# Patient Record
Sex: MALE | Race: WHITE | NOT HISPANIC OR LATINO | Employment: FULL TIME | URBAN - METROPOLITAN AREA
[De-identification: names, ages, dates, MRNs, and addresses within clinical notes are randomized per-mention and may not be internally consistent; named-entity substitution may affect disease eponyms.]

---

## 2023-11-21 ENCOUNTER — APPOINTMENT (OUTPATIENT)
Dept: PRIMARY CARE | Facility: CLINIC | Age: 28
End: 2023-11-21
Payer: COMMERCIAL

## 2025-06-22 ENCOUNTER — OFFICE VISIT (OUTPATIENT)
Dept: URGENT CARE | Age: 30
End: 2025-06-22

## 2025-06-22 VITALS
HEART RATE: 60 BPM | TEMPERATURE: 97.5 F | RESPIRATION RATE: 16 BRPM | DIASTOLIC BLOOD PRESSURE: 85 MMHG | SYSTOLIC BLOOD PRESSURE: 136 MMHG | OXYGEN SATURATION: 97 %

## 2025-06-22 DIAGNOSIS — S30.861A TICK BITE OF ABDOMEN, INITIAL ENCOUNTER: Primary | ICD-10-CM
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
## 2025-06-22 PROCEDURE — 99203 OFFICE O/P NEW LOW 30 MIN: CPT | Performed by: FAMILY MEDICINE

## 2025-06-22 RX ORDER — DOXYCYCLINE 100 MG/1
CAPSULE ORAL
Qty: 2 CAPSULE | Refills: 0 | Status: SHIPPED | OUTPATIENT
Start: 2025-06-22

## 2025-06-22 NOTE — PATIENT INSTRUCTIONS
Antibiotics as directed; take with food  Tylenol or Ibuprofen as needed  Follow up with new or worsening symptoms

## 2025-06-22 NOTE — PROGRESS NOTES
Subjective   Patient ID: Marcelino oMrris is a 29 y.o. male. He presents today with a chief complaint of Insect Bite (X 1 day). Patient presents with 2 tick bites on abdomen and torso, removed today; he was hiking in [place] yesterday; saw the ticks on his abdomen and right chest today and removed them. They were very small, brown, thinks they may have been engorged. No symptoms are reported. Ticks are here, dead, in a plastic bag; whole.    History of Present Illness  HPI    Past Medical History  Allergies as of 06/22/2025    (No Known Allergies)       Prescriptions Prior to Admission[1]     Medical History[2]    Surgical History[3]         Review of Systems  Review of Systems                               Objective    Vitals:    06/22/25 1916   BP: 136/85   Pulse: 60   Resp: 16   Temp: 36.4 °C (97.5 °F)   SpO2: 97%     No LMP for male patient.    Physical Exam  Vitals and nursing note reviewed.   Constitutional:       General: He is not in acute distress.     Appearance: Normal appearance. He is not ill-appearing or toxic-appearing.   Cardiovascular:      Rate and Rhythm: Normal rate and regular rhythm.      Pulses: Normal pulses.      Heart sounds: Normal heart sounds.   Pulmonary:      Effort: Pulmonary effort is normal.      Breath sounds: Normal breath sounds.   Skin:     General: Skin is warm.      Comments: 2 small punctate lesions without foreign body noted   Neurological:      Mental Status: He is alert.         Procedures    Point of Care Test & Imaging Results from this visit  No results found for this visit on 06/22/25.   Imaging  No results found.    Cardiology, Vascular, and Other Imaging  No other imaging results found for the past 2 days      Diagnostic study results (if any) were reviewed by Tiffanie Nieves MD.    Assessment/Plan   Allergies, medications, history, and pertinent labs/EKGs/Imaging reviewed by Tiffanie Nieves MD.     Medical Decision Making      Orders and Diagnoses  There are no  diagnoses linked to this encounter.    Medical Admin Record      Patient disposition: Home    Electronically signed by Tiffanie Nieves MD  7:37 PM           [1] (Not in a hospital admission)  [2] No past medical history on file.  [3] No past surgical history on file.